# Patient Record
Sex: FEMALE | Race: WHITE | NOT HISPANIC OR LATINO | URBAN - METROPOLITAN AREA
[De-identification: names, ages, dates, MRNs, and addresses within clinical notes are randomized per-mention and may not be internally consistent; named-entity substitution may affect disease eponyms.]

---

## 2018-03-27 ENCOUNTER — APPOINTMENT (RX ONLY)
Dept: URBAN - METROPOLITAN AREA CLINIC 17 | Facility: CLINIC | Age: 33
Setting detail: DERMATOLOGY
End: 2018-03-27

## 2018-03-27 DIAGNOSIS — L94.0 LOCALIZED SCLERODERMA [MORPHEA]: ICD-10-CM

## 2018-03-27 PROCEDURE — ? COUNSELING

## 2018-03-27 PROCEDURE — ? PRESCRIPTION

## 2018-03-27 PROCEDURE — 99202 OFFICE O/P NEW SF 15 MIN: CPT

## 2018-03-27 PROCEDURE — ? TREATMENT REGIMEN

## 2018-03-27 RX ORDER — METHOTREXATE SODIUM 2.5 MG/1
1 TABLET ORAL WEEKLY
Qty: 24 | Refills: 2 | Status: ERX

## 2018-03-27 RX ORDER — FOLIC ACID 1 MG
2 TABLET ORAL QD
Qty: 60 | Refills: 11 | Status: ERX

## 2018-03-27 ASSESSMENT — LOCATION DETAILED DESCRIPTION DERM: LOCATION DETAILED: SUBXIPHOID

## 2018-03-27 ASSESSMENT — LOCATION SIMPLE DESCRIPTION DERM: LOCATION SIMPLE: ABDOMEN

## 2018-03-27 ASSESSMENT — LOCATION ZONE DERM: LOCATION ZONE: TRUNK

## 2018-03-27 NOTE — PROCEDURE: TREATMENT REGIMEN
Plan: After first dose of methotrexate check blood work. Lab slip given for standing order CBC and CMP .
Initiate Treatment: Methotrexate 2.5 mg - take 6 pills once a week on the same day \\nFolic acid 1mg- take 2 pills everyday
Detail Level: Simple

## 2018-05-29 ENCOUNTER — APPOINTMENT (RX ONLY)
Dept: URBAN - METROPOLITAN AREA CLINIC 17 | Facility: CLINIC | Age: 33
Setting detail: DERMATOLOGY
End: 2018-05-29

## 2018-05-29 DIAGNOSIS — L94.0 LOCALIZED SCLERODERMA [MORPHEA]: ICD-10-CM | Status: UNCHANGED

## 2018-05-29 PROCEDURE — ? PRESCRIPTION

## 2018-05-29 PROCEDURE — ? TREATMENT REGIMEN

## 2018-05-29 PROCEDURE — ? COUNSELING

## 2018-05-29 PROCEDURE — 99213 OFFICE O/P EST LOW 20 MIN: CPT

## 2018-05-29 PROCEDURE — ? OTHER

## 2018-05-29 RX ORDER — METHOTREXATE SODIUM 2.5 MG/1
8 TABLET ORAL WEEKLY
Qty: 32 | Refills: 2 | Status: ERX

## 2018-05-29 ASSESSMENT — LOCATION ZONE DERM: LOCATION ZONE: TRUNK

## 2018-05-29 ASSESSMENT — LOCATION DETAILED DESCRIPTION DERM: LOCATION DETAILED: MIDDLE STERNUM

## 2018-05-29 ASSESSMENT — LOCATION SIMPLE DESCRIPTION DERM: LOCATION SIMPLE: CHEST

## 2018-05-29 NOTE — PROCEDURE: OTHER
Note Text (......Xxx Chief Complaint.): This diagnosis correlates with the
Other (Free Text): Get updated labs before next visit
Detail Level: Simple

## 2018-05-29 NOTE — PROCEDURE: TREATMENT REGIMEN
Detail Level: Simple
Continue Regimen: Folic acid 2 daily
Modify Regimen: Increase methotrexate to 8 pills once weekly

## 2018-06-26 ENCOUNTER — APPOINTMENT (RX ONLY)
Dept: URBAN - METROPOLITAN AREA CLINIC 17 | Facility: CLINIC | Age: 33
Setting detail: DERMATOLOGY
End: 2018-06-26

## 2018-06-26 DIAGNOSIS — L94.0 LOCALIZED SCLERODERMA [MORPHEA]: ICD-10-CM | Status: IMPROVED

## 2018-06-26 PROCEDURE — ? PRESCRIPTION

## 2018-06-26 PROCEDURE — 99213 OFFICE O/P EST LOW 20 MIN: CPT

## 2018-06-26 PROCEDURE — ? TREATMENT REGIMEN

## 2018-06-26 PROCEDURE — ? COUNSELING

## 2018-06-26 RX ORDER — METHOTREXATE SODIUM 2.5 MG/1
1 TABLET ORAL WEEKLY
Qty: 32 | Refills: 2 | Status: ERX

## 2018-06-26 RX ORDER — BETAMETHASONE DIPROPIONATE 0.5 MG/G
1 CREAM TOPICAL BID
Qty: 1 | Refills: 6 | Status: ERX

## 2018-06-26 ASSESSMENT — LOCATION DETAILED DESCRIPTION DERM
LOCATION DETAILED: LEFT MEDIAL BREAST 7-8:00 REGION
LOCATION DETAILED: PERIUMBILICAL SKIN
LOCATION DETAILED: RIGHT INFERIOR MEDIAL MIDBACK

## 2018-06-26 ASSESSMENT — LOCATION SIMPLE DESCRIPTION DERM
LOCATION SIMPLE: ABDOMEN
LOCATION SIMPLE: LEFT BREAST
LOCATION SIMPLE: RIGHT LOWER BACK

## 2018-06-26 ASSESSMENT — LOCATION ZONE DERM: LOCATION ZONE: TRUNK

## 2018-06-26 NOTE — PROCEDURE: TREATMENT REGIMEN
Continue Regimen: Methotrexate 2.5 mg- take 8 pills once weekly\\nFolic acid - take 2 pills once daily
Detail Level: Simple
Initiate Treatment: Betamethasone, Augmented Cream- apply to affected areas twice daily as needed for flares

## 2018-08-21 ENCOUNTER — APPOINTMENT (RX ONLY)
Dept: URBAN - METROPOLITAN AREA CLINIC 17 | Facility: CLINIC | Age: 33
Setting detail: DERMATOLOGY
End: 2018-08-21

## 2018-08-21 DIAGNOSIS — L94.0 LOCALIZED SCLERODERMA [MORPHEA]: ICD-10-CM | Status: IMPROVED

## 2018-08-21 PROCEDURE — ? PRESCRIPTION

## 2018-08-21 PROCEDURE — 99213 OFFICE O/P EST LOW 20 MIN: CPT

## 2018-08-21 PROCEDURE — ? TREATMENT REGIMEN

## 2018-08-21 PROCEDURE — ? COUNSELING

## 2018-08-21 RX ORDER — METHOTREXATE SODIUM 2.5 MG/1
8 TABLET ORAL WEEKLY
Qty: 32 | Refills: 4 | Status: ERX

## 2018-12-18 ENCOUNTER — APPOINTMENT (RX ONLY)
Dept: URBAN - METROPOLITAN AREA CLINIC 17 | Facility: CLINIC | Age: 33
Setting detail: DERMATOLOGY
End: 2018-12-18

## 2018-12-18 DIAGNOSIS — L94.0 LOCALIZED SCLERODERMA [MORPHEA]: ICD-10-CM | Status: IMPROVED

## 2018-12-18 PROCEDURE — 99213 OFFICE O/P EST LOW 20 MIN: CPT

## 2018-12-18 PROCEDURE — ? PRESCRIPTION

## 2018-12-18 PROCEDURE — ? TREATMENT REGIMEN

## 2018-12-18 PROCEDURE — ? COUNSELING

## 2018-12-18 RX ORDER — METHOTREXATE SODIUM 2.5 MG/1
8 TABLET ORAL WEEKLY
Qty: 32 | Refills: 3 | Status: ERX

## 2018-12-18 RX ORDER — FOLIC ACID 1 MG
3 TABLET ORAL QD
Qty: 90 | Refills: 11 | Status: ERX

## 2018-12-18 ASSESSMENT — LOCATION DETAILED DESCRIPTION DERM
LOCATION DETAILED: PERIUMBILICAL SKIN
LOCATION DETAILED: RIGHT INFERIOR UPPER BACK

## 2018-12-18 ASSESSMENT — LOCATION SIMPLE DESCRIPTION DERM
LOCATION SIMPLE: RIGHT UPPER BACK
LOCATION SIMPLE: ABDOMEN

## 2018-12-18 ASSESSMENT — LOCATION ZONE DERM: LOCATION ZONE: TRUNK

## 2018-12-18 NOTE — PROCEDURE: TREATMENT REGIMEN
Modify Regimen: Increase folic acid to 3 pills a day on the day day before Methotrexate, the of and the day after. Continue with just 2 pills of the folic acid every day.
Continue Regimen: Methotrexate 2.5 mg - take 8 pills once a week on the same day
Plan: Recheck blood work one week before next visit.
Detail Level: Zone

## 2019-04-09 ENCOUNTER — APPOINTMENT (RX ONLY)
Dept: URBAN - METROPOLITAN AREA CLINIC 17 | Facility: CLINIC | Age: 34
Setting detail: DERMATOLOGY
End: 2019-04-09

## 2019-04-09 DIAGNOSIS — L94.0 LOCALIZED SCLERODERMA [MORPHEA]: ICD-10-CM | Status: IMPROVED

## 2019-04-09 PROCEDURE — ? TREATMENT REGIMEN

## 2019-04-09 PROCEDURE — ? COUNSELING

## 2019-04-09 PROCEDURE — 99213 OFFICE O/P EST LOW 20 MIN: CPT

## 2019-04-09 PROCEDURE — ? PRESCRIPTION

## 2019-04-09 RX ORDER — FOLIC ACID 1 MG
3 TABLET ORAL QD
Qty: 90 | Refills: 11 | Status: ERX

## 2019-04-09 RX ORDER — METHOTREXATE SODIUM 2.5 MG/1
8 TABLET ORAL WEEKLY
Qty: 32 | Refills: 3 | Status: ERX

## 2019-04-09 ASSESSMENT — LOCATION ZONE DERM: LOCATION ZONE: TRUNK

## 2019-04-09 ASSESSMENT — LOCATION SIMPLE DESCRIPTION DERM
LOCATION SIMPLE: RIGHT LOWER BACK
LOCATION SIMPLE: ABDOMEN

## 2019-04-09 ASSESSMENT — LOCATION DETAILED DESCRIPTION DERM
LOCATION DETAILED: RIGHT INFERIOR MEDIAL MIDBACK
LOCATION DETAILED: PERIUMBILICAL SKIN

## 2019-04-09 NOTE — PROCEDURE: TREATMENT REGIMEN
Plan: Recheck blood work one week before next visit.
Continue Regimen: Methotrexate 2.5 mg - take 8 pills once a week on the same day\\nFolic acid three pills daily
Detail Level: Zone

## 2020-12-29 ENCOUNTER — APPOINTMENT (RX ONLY)
Dept: URBAN - METROPOLITAN AREA CLINIC 17 | Facility: CLINIC | Age: 35
Setting detail: DERMATOLOGY
End: 2020-12-29

## 2020-12-29 VITALS — TEMPERATURE: 97.3 F

## 2020-12-29 DIAGNOSIS — L94.0 LOCALIZED SCLERODERMA [MORPHEA]: ICD-10-CM | Status: WORSENING

## 2020-12-29 PROCEDURE — ? TREATMENT REGIMEN

## 2020-12-29 PROCEDURE — ? PRESCRIPTION

## 2020-12-29 PROCEDURE — ? COUNSELING

## 2020-12-29 PROCEDURE — 99213 OFFICE O/P EST LOW 20 MIN: CPT

## 2020-12-29 PROCEDURE — ? ORDER TESTS

## 2020-12-29 PROCEDURE — ? METHOTREXATE INITIATION

## 2020-12-29 RX ORDER — METHOTREXATE SODIUM 2.5 MG/1
6 TABLET ORAL WEEKLY
Qty: 24 | Refills: 3 | Status: ERX

## 2020-12-29 RX ORDER — CLOBETASOL PROPIONATE 0.5 MG/G
1 CREAM TOPICAL BID
Qty: 1 | Refills: 11 | Status: ERX

## 2020-12-29 RX ORDER — FOLIC ACID 1 MG/1
1 TABLET ORAL QD
Qty: 30 | Refills: 11 | Status: ERX

## 2020-12-29 ASSESSMENT — LOCATION ZONE DERM
LOCATION ZONE: LEG
LOCATION ZONE: ARM

## 2020-12-29 ASSESSMENT — LOCATION SIMPLE DESCRIPTION DERM
LOCATION SIMPLE: RIGHT THIGH
LOCATION SIMPLE: LEFT THIGH
LOCATION SIMPLE: LEFT FOREARM
LOCATION SIMPLE: RIGHT FOREARM

## 2020-12-29 ASSESSMENT — LOCATION DETAILED DESCRIPTION DERM
LOCATION DETAILED: RIGHT VENTRAL DISTAL FOREARM
LOCATION DETAILED: LEFT ANTERIOR PROXIMAL THIGH
LOCATION DETAILED: LEFT VENTRAL DISTAL FOREARM
LOCATION DETAILED: RIGHT ANTERIOR PROXIMAL THIGH

## 2020-12-29 NOTE — HPI: RASH (MORPHEA)
How Severe Is It?: moderate
Is This A New Presentation, Or A Follow-Up?: Follow Up Morphea
Additional History: Patient was previously on methotrexate which did help. She has been off for about a year now. She began flaring in April.

## 2020-12-29 NOTE — PROCEDURE: TREATMENT REGIMEN
Plan: Starting dose at 4 pills a week. Then increase to 6 pills once weekly. After second dose of methotrexate, patient to have blood work checked. Recheck blood work prior to next appointment
Detail Level: Simple

## 2021-02-17 ENCOUNTER — RX ONLY (OUTPATIENT)
Age: 36
Setting detail: RX ONLY
End: 2021-02-17

## 2021-02-17 RX ORDER — CLOBETASOL PROPIONATE 0.5 MG/G
1 CREAM TOPICAL BID
Qty: 1 | Refills: 11 | Status: ERX

## 2021-02-17 RX ORDER — FOLIC ACID 1 MG/1
1 TABLET ORAL QD
Qty: 30 | Refills: 11 | Status: ERX

## 2021-04-13 ENCOUNTER — APPOINTMENT (RX ONLY)
Dept: URBAN - METROPOLITAN AREA CLINIC 17 | Facility: CLINIC | Age: 36
Setting detail: DERMATOLOGY
End: 2021-04-13

## 2021-04-13 DIAGNOSIS — L94.0 LOCALIZED SCLERODERMA [MORPHEA]: ICD-10-CM | Status: IMPROVED

## 2021-04-13 PROCEDURE — 99214 OFFICE O/P EST MOD 30 MIN: CPT

## 2021-04-13 PROCEDURE — ? COUNSELING

## 2021-04-13 PROCEDURE — ? PRESCRIPTION

## 2021-04-13 PROCEDURE — ? ORDER TESTS

## 2021-04-13 PROCEDURE — ? TREATMENT REGIMEN

## 2021-04-13 RX ORDER — METHOTREXATE SODIUM 2.5 MG/1
TABLET ORAL WEEKLY
Qty: 24 | Refills: 4 | Status: ERX

## 2021-04-13 ASSESSMENT — LOCATION SIMPLE DESCRIPTION DERM
LOCATION SIMPLE: LEFT LOWER BACK
LOCATION SIMPLE: RIGHT FOREARM
LOCATION SIMPLE: LEFT FOREARM

## 2021-04-13 ASSESSMENT — LOCATION ZONE DERM
LOCATION ZONE: TRUNK
LOCATION ZONE: ARM

## 2021-04-13 ASSESSMENT — LOCATION DETAILED DESCRIPTION DERM
LOCATION DETAILED: LEFT INFERIOR MEDIAL MIDBACK
LOCATION DETAILED: LEFT VENTRAL PROXIMAL FOREARM
LOCATION DETAILED: RIGHT VENTRAL PROXIMAL FOREARM

## 2021-04-13 NOTE — PROCEDURE: TREATMENT REGIMEN
Modify Regimen: Increase methotrexate 2.5mg 6 pills once weekly
Detail Level: Simple
Plan: Patient is getting blood work before next visit

## 2021-04-13 NOTE — PROCEDURE: ORDER TESTS
Performing Laboratory: [id number]
Billing Type: Third-Party Bill
Expected Date Of Service: 04/13/2021
Bill For Surgical Tray: no

## 2023-05-09 ENCOUNTER — APPOINTMENT (RX ONLY)
Dept: URBAN - METROPOLITAN AREA CLINIC 17 | Facility: CLINIC | Age: 38
Setting detail: DERMATOLOGY
End: 2023-05-09

## 2023-05-09 DIAGNOSIS — D18.0 HEMANGIOMA: ICD-10-CM

## 2023-05-09 DIAGNOSIS — L94.0 LOCALIZED SCLERODERMA [MORPHEA]: ICD-10-CM | Status: RESOLVED

## 2023-05-09 DIAGNOSIS — D22 MELANOCYTIC NEVI: ICD-10-CM

## 2023-05-09 DIAGNOSIS — L82.1 OTHER SEBORRHEIC KERATOSIS: ICD-10-CM

## 2023-05-09 PROBLEM — D18.01 HEMANGIOMA OF SKIN AND SUBCUTANEOUS TISSUE: Status: ACTIVE | Noted: 2023-05-09

## 2023-05-09 PROBLEM — D22.5 MELANOCYTIC NEVI OF TRUNK: Status: ACTIVE | Noted: 2023-05-09

## 2023-05-09 PROBLEM — D48.5 NEOPLASM OF UNCERTAIN BEHAVIOR OF SKIN: Status: ACTIVE | Noted: 2023-05-09

## 2023-05-09 PROCEDURE — 11102 TANGNTL BX SKIN SINGLE LES: CPT

## 2023-05-09 PROCEDURE — ? BIOPSY BY SHAVE METHOD

## 2023-05-09 PROCEDURE — ? COUNSELING

## 2023-05-09 PROCEDURE — 99213 OFFICE O/P EST LOW 20 MIN: CPT | Mod: 25

## 2023-05-09 ASSESSMENT — LOCATION SIMPLE DESCRIPTION DERM
LOCATION SIMPLE: LEFT UPPER BACK
LOCATION SIMPLE: ABDOMEN
LOCATION SIMPLE: RIGHT UPPER BACK
LOCATION SIMPLE: LEFT LOWER BACK

## 2023-05-09 ASSESSMENT — LOCATION DETAILED DESCRIPTION DERM
LOCATION DETAILED: RIGHT MID-UPPER BACK
LOCATION DETAILED: LEFT MID-UPPER BACK
LOCATION DETAILED: LEFT SUPERIOR MEDIAL MIDBACK
LOCATION DETAILED: LEFT RIB CAGE

## 2023-05-09 ASSESSMENT — LOCATION ZONE DERM: LOCATION ZONE: TRUNK

## 2023-05-09 NOTE — PROCEDURE: BIOPSY BY SHAVE METHOD
Detail Level: Detailed
Depth Of Biopsy: dermis
Was A Bandage Applied: Yes
Size Of Lesion In Cm: 0
Biopsy Type: H and E
Biopsy Method: double edge Personna blade
Anesthesia Type: 2% lidocaine with epinephrine
Anesthesia Volume In Cc: 1
Hemostasis: Chaim's
Wound Care: Mupirocin
Dressing: Band-Aid
Destruction After The Procedure: No
Type Of Destruction Used: Curettage
Cryotherapy Text: The wound bed was treated with cryotherapy after the biopsy was performed.
Electrodesiccation Text: The wound bed was treated with electrodesiccation after the biopsy was performed.
Electrodesiccation And Curettage Text: The wound bed was treated with electrodesiccation and curettage after the biopsy was performed.
Silver Nitrate Text: The wound bed was treated with silver nitrate after the biopsy
Lab: [id number]
Consent: Verbal consent was obtained and risks were reviewed including, but not limited to, scarring, infection, bleeding, scabbing, and incomplete removal.
Post-Care Instructions: Keep area covered and dry for the next 24 hours. Then wash with warm and soapy water and keep area moist with Vasaline/Polysporin
Notification Instructions: Patient will be notified of biopsy results within one week
Billing Type: Third-Party Bill
Information: Selecting Yes will display possible errors in your note based on the variables you have selected. This validation is only offered as a suggestion for you. PLEASE NOTE THAT THE VALIDATION TEXT WILL BE REMOVED WHEN YOU FINALIZE YOUR NOTE. IF YOU WANT TO FAX A PRELIMINARY NOTE YOU WILL NEED TO TOGGLE THIS TO 'NO' IF YOU DO NOT WANT IT IN YOUR FAXED NOTE.

## 2025-08-21 ENCOUNTER — APPOINTMENT (OUTPATIENT)
Dept: URBAN - METROPOLITAN AREA CLINIC 17 | Facility: CLINIC | Age: 40
Setting detail: DERMATOLOGY
End: 2025-08-21

## 2025-08-21 DIAGNOSIS — L90.0 LICHEN SCLEROSUS ET ATROPHICUS: ICD-10-CM | Status: INADEQUATELY CONTROLLED

## 2025-08-21 DIAGNOSIS — D22 MELANOCYTIC NEVI: ICD-10-CM

## 2025-08-21 DIAGNOSIS — L94.0 LOCALIZED SCLERODERMA [MORPHEA]: ICD-10-CM

## 2025-08-21 PROBLEM — D22.39 MELANOCYTIC NEVI OF OTHER PARTS OF FACE: Status: ACTIVE | Noted: 2025-08-21

## 2025-08-21 PROBLEM — D22.5 MELANOCYTIC NEVI OF TRUNK: Status: ACTIVE | Noted: 2025-08-21

## 2025-08-21 PROCEDURE — ? TREATMENT REGIMEN

## 2025-08-21 PROCEDURE — ? ORDER TESTS

## 2025-08-21 PROCEDURE — ? COUNSELING

## 2025-08-21 PROCEDURE — ? PRESCRIPTION

## 2025-08-21 RX ORDER — METHOTREXATE SODIUM 2.5 MG/1
3 TABLET ORAL QWEEK
Qty: 16 | Refills: 3 | Status: ERX | COMMUNITY
Start: 2025-08-21

## 2025-08-21 RX ORDER — FOLIC ACID 1 MG/1
1 TABLET ORAL QD
Qty: 60 | Refills: 3 | Status: ERX | COMMUNITY
Start: 2025-08-21

## 2025-08-21 RX ADMIN — METHOTREXATE SODIUM 3: 2.5 TABLET ORAL at 00:00

## 2025-08-21 RX ADMIN — FOLIC ACID 1: 1 TABLET ORAL at 00:00

## 2025-08-21 ASSESSMENT — LOCATION SIMPLE DESCRIPTION DERM
LOCATION SIMPLE: RIGHT UPPER BACK
LOCATION SIMPLE: RIGHT THIGH
LOCATION SIMPLE: LEFT FOREARM
LOCATION SIMPLE: LEFT BREAST
LOCATION SIMPLE: LEFT THIGH
LOCATION SIMPLE: RIGHT FOREARM
LOCATION SIMPLE: LEFT FOREHEAD
LOCATION SIMPLE: RIGHT LOWER BACK

## 2025-08-21 ASSESSMENT — LOCATION DETAILED DESCRIPTION DERM
LOCATION DETAILED: LEFT MEDIAL BREAST 8-9:00 REGION
LOCATION DETAILED: LEFT VENTRAL PROXIMAL FOREARM
LOCATION DETAILED: LEFT SUPERIOR FOREHEAD
LOCATION DETAILED: RIGHT VENTRAL DISTAL FOREARM
LOCATION DETAILED: LEFT ANTERIOR DISTAL THIGH
LOCATION DETAILED: RIGHT INFERIOR LATERAL MIDBACK
LOCATION DETAILED: RIGHT ANTERIOR DISTAL THIGH
LOCATION DETAILED: RIGHT SUPERIOR UPPER BACK

## 2025-08-21 ASSESSMENT — LOCATION ZONE DERM
LOCATION ZONE: ARM
LOCATION ZONE: FACE
LOCATION ZONE: TRUNK
LOCATION ZONE: LEG